# Patient Record
Sex: FEMALE | Race: WHITE | NOT HISPANIC OR LATINO | Employment: FULL TIME | URBAN - METROPOLITAN AREA
[De-identification: names, ages, dates, MRNs, and addresses within clinical notes are randomized per-mention and may not be internally consistent; named-entity substitution may affect disease eponyms.]

---

## 2019-07-15 ENCOUNTER — CLINICAL SUPPORT (OUTPATIENT)
Dept: OTHER | Facility: CLINIC | Age: 69
End: 2019-07-15
Payer: COMMERCIAL

## 2019-07-15 DIAGNOSIS — Z00.8 ENCOUNTER FOR OTHER GENERAL EXAMINATION: ICD-10-CM

## 2019-07-15 PROCEDURE — 80061 PR  LIPID PANEL: ICD-10-PCS | Mod: QW,S$GLB,, | Performed by: INTERNAL MEDICINE

## 2019-07-15 PROCEDURE — 99401 PR PREVENT COUNSEL,INDIV,15 MIN: ICD-10-PCS | Mod: S$GLB,,, | Performed by: INTERNAL MEDICINE

## 2019-07-15 PROCEDURE — 99401 PREV MED CNSL INDIV APPRX 15: CPT | Mod: S$GLB,,, | Performed by: INTERNAL MEDICINE

## 2019-07-15 PROCEDURE — 80061 LIPID PANEL: CPT | Mod: QW,S$GLB,, | Performed by: INTERNAL MEDICINE

## 2019-07-15 PROCEDURE — 82947 PR  ASSAY QUANTITATIVE,BLOOD GLUCOSE: ICD-10-PCS | Mod: QW,S$GLB,, | Performed by: INTERNAL MEDICINE

## 2019-07-15 PROCEDURE — 82947 ASSAY GLUCOSE BLOOD QUANT: CPT | Mod: QW,S$GLB,, | Performed by: INTERNAL MEDICINE

## 2019-07-18 VITALS — BODY MASS INDEX: 36.04 KG/M2 | HEIGHT: 65 IN

## 2019-07-18 LAB
HDLC SERPL-MCNC: 62 MG/DL
POC CHOLESTEROL, LDL (DOCK): 123 MG/DL
POC CHOLESTEROL, TOTAL: 212 MG/DL
POC GLUCOSE, FASTING: 112 MG/DL (ref 60–110)
TRIGL SERPL-MCNC: 134 MG/DL

## 2022-09-23 DIAGNOSIS — S83.412A SPRAIN OF MEDIAL COLLATERAL LIGAMENT OF LEFT KNEE, INITIAL ENCOUNTER: Primary | ICD-10-CM

## 2022-09-23 DIAGNOSIS — M23.92 ACUTE INTERNAL DERANGEMENT OF LEFT KNEE: ICD-10-CM

## 2022-10-07 ENCOUNTER — HOSPITAL ENCOUNTER (OUTPATIENT)
Dept: RADIOLOGY | Facility: HOSPITAL | Age: 72
Discharge: HOME | End: 2022-10-07
Attending: PHYSICIAN ASSISTANT
Payer: COMMERCIAL

## 2022-10-07 DIAGNOSIS — M23.92 ACUTE INTERNAL DERANGEMENT OF LEFT KNEE: ICD-10-CM

## 2022-10-07 DIAGNOSIS — S83.412A SPRAIN OF MEDIAL COLLATERAL LIGAMENT OF LEFT KNEE, INITIAL ENCOUNTER: ICD-10-CM

## 2022-10-07 PROCEDURE — 73721 MRI JNT OF LWR EXTRE W/O DYE: CPT | Mod: 26,LT,, | Performed by: RADIOLOGY

## 2022-10-07 PROCEDURE — 73721 MRI JNT OF LWR EXTRE W/O DYE: CPT | Mod: TC,LT

## 2022-10-07 PROCEDURE — 73721 MRI KNEE WITHOUT CONTRAST LEFT: ICD-10-PCS | Mod: 26,LT,, | Performed by: RADIOLOGY

## 2023-02-16 ENCOUNTER — CLINICAL SUPPORT (OUTPATIENT)
Dept: REHABILITATION | Facility: HOSPITAL | Age: 73
End: 2023-02-16
Payer: COMMERCIAL

## 2023-02-16 DIAGNOSIS — R53.1 WEAKNESS: ICD-10-CM

## 2023-02-16 DIAGNOSIS — S63.659A SAGITTAL BAND RUPTURE AT METACARPOPHALANGEAL JOINT, INITIAL ENCOUNTER: ICD-10-CM

## 2023-02-16 PROCEDURE — L3913 HFO W/O JOINTS CF: HCPCS | Mod: PO

## 2023-02-16 NOTE — PROGRESS NOTES
=  OCCUPATIONAL THERAPY --- ORTHOTIC EVALUATION - FITTING - TRAINING     Patient: Meche Gu  Date of Evaluation: 2/16/2023  MRN: [id number]  Diagnosis:   Encounter Diagnoses   Name Primary?    Sagittal band rupture at metacarpophalangeal joint, initial encounter     Weakness      Physician: Juan Escalera MD    ORDERS: Custom thermoplastic hand based long finger MCP extension orthosis for radial sagittal band injury.  Plan for transition relative motion splint at likely 3-4 weeks      SUBJECTIVE:   pain with MP flexion    Pain: 1 /10    OBJECTIVE:     Observations:arrived 25 minute late    Range of Motion (in degrees):did not take     Strength (in pounds): Deferred       Circumferential Edema Measurements (in cm): Deferred       ADLs/Function:      ASSESSMENT:   Initial OT evaluation completed.  Fabricated custom radial hand based MP extension orthosis for LF  orthosis and LF MP blocking orthosis for night use  , per MD orders.  Instructed in orthotic use, wear, care and precautions in detail w/ patient.      Rehab Potential: good    GOALS: to be determined on evaluation     PLAN:      Plan for transition relative motion splint at likely 3-4 weeks        GENET Lewis, OTR/L, CHT  Occupational therapist, Certified Hand Therapist